# Patient Record
Sex: MALE | Employment: FULL TIME | URBAN - METROPOLITAN AREA
[De-identification: names, ages, dates, MRNs, and addresses within clinical notes are randomized per-mention and may not be internally consistent; named-entity substitution may affect disease eponyms.]

---

## 2024-03-06 ENCOUNTER — HOSPITAL ENCOUNTER (EMERGENCY)
Age: 44
Discharge: HOME | End: 2024-03-06
Attending: EMERGENCY MEDICINE
Payer: COMMERCIAL

## 2024-03-06 VITALS
HEART RATE: 98 BPM | OXYGEN SATURATION: 98 % | DIASTOLIC BLOOD PRESSURE: 99 MMHG | TEMPERATURE: 97.9 F | HEIGHT: 66 IN | WEIGHT: 217 LBS | RESPIRATION RATE: 16 BRPM | SYSTOLIC BLOOD PRESSURE: 162 MMHG | BODY MASS INDEX: 34.87 KG/M2

## 2024-03-06 DIAGNOSIS — H72.92 PERFORATION OF LEFT TYMPANIC MEMBRANE: Primary | ICD-10-CM

## 2024-03-06 PROCEDURE — 99283 EMERGENCY DEPT VISIT LOW MDM: CPT

## 2024-03-06 RX ORDER — AMOXICILLIN 500 MG/1
500 CAPSULE ORAL 3 TIMES DAILY
Qty: 21 CAPSULE | Refills: 0 | Status: SHIPPED | OUTPATIENT
Start: 2024-03-06 | End: 2024-03-13

## 2024-03-06 ASSESSMENT — PAIN SCALES - GENERAL: PAINLEVEL_OUTOF10: 1

## 2024-03-06 ASSESSMENT — PAIN - FUNCTIONAL ASSESSMENT: PAIN_FUNCTIONAL_ASSESSMENT: 0-10

## 2024-03-06 NOTE — ED TRIAGE NOTES
Patient reports he was cleaning left ear yesterday with q tip and hit his hand accidentally injuring left ear.

## 2024-03-06 NOTE — FLOWSHEET NOTE
I have reviewed discharge instructions with the patient.  The patient verbalized understanding.    Patient left ED via Discharge Method: ambulatory to Home with self.    Opportunity for questions and clarification provided.       Patient given 1 scripts.         To continue your aftercare when you leave the hospital, you may receive an automated call from our care team to check in on how you are doing.  This is a free service and part of our promise to provide the best care and service to meet your aftercare needs.” If you have questions, or wish to unsubscribe from this service please call [phone].  Thank you for Choosing our [facility] Emergency Department.

## 2024-03-06 NOTE — DISCHARGE INSTRUCTIONS
As we discussed, you have a perforated eardrum on the left.  This should heal spontaneously.  It is important that you keep your ear canal dry.  You can use wax obtained at a pharmacy to help occlude your eardrum to help decrease discomfort and keep water from going in the ear.  Follow-up with ENT for further evaluation.

## 2024-04-01 ENCOUNTER — OFFICE VISIT (OUTPATIENT)
Dept: ENT CLINIC | Age: 44
End: 2024-04-01
Payer: COMMERCIAL

## 2024-04-01 VITALS
RESPIRATION RATE: 19 BRPM | DIASTOLIC BLOOD PRESSURE: 86 MMHG | WEIGHT: 216 LBS | SYSTOLIC BLOOD PRESSURE: 146 MMHG | HEIGHT: 66 IN | BODY MASS INDEX: 34.72 KG/M2

## 2024-04-01 DIAGNOSIS — H61.21 IMPACTED CERUMEN OF RIGHT EAR: ICD-10-CM

## 2024-04-01 DIAGNOSIS — R06.81 APNEA: ICD-10-CM

## 2024-04-01 DIAGNOSIS — Z72.0 TOBACCO ABUSE: ICD-10-CM

## 2024-04-01 DIAGNOSIS — J34.89 NASAL OBSTRUCTION: ICD-10-CM

## 2024-04-01 DIAGNOSIS — J34.3 NASAL TURBINATE HYPERTROPHY: ICD-10-CM

## 2024-04-01 DIAGNOSIS — J34.2 DEVIATED NASAL SEPTUM: ICD-10-CM

## 2024-04-01 DIAGNOSIS — H72.92 PERFORATION OF LEFT TYMPANIC MEMBRANE: Primary | ICD-10-CM

## 2024-04-01 DIAGNOSIS — J34.89 NASAL DRYNESS: ICD-10-CM

## 2024-04-01 PROCEDURE — 31231 NASAL ENDOSCOPY DX: CPT | Performed by: STUDENT IN AN ORGANIZED HEALTH CARE EDUCATION/TRAINING PROGRAM

## 2024-04-01 PROCEDURE — 99244 OFF/OP CNSLTJ NEW/EST MOD 40: CPT | Performed by: STUDENT IN AN ORGANIZED HEALTH CARE EDUCATION/TRAINING PROGRAM

## 2024-04-01 PROCEDURE — 69210 REMOVE IMPACTED EAR WAX UNI: CPT | Performed by: STUDENT IN AN ORGANIZED HEALTH CARE EDUCATION/TRAINING PROGRAM

## 2024-04-01 RX ORDER — CIPROFLOXACIN AND DEXAMETHASONE 3; 1 MG/ML; MG/ML
4 SUSPENSION/ DROPS AURICULAR (OTIC) 2 TIMES DAILY
Qty: 1 EACH | Refills: 0 | Status: SHIPPED | OUTPATIENT
Start: 2024-04-01 | End: 2024-04-08

## 2024-04-01 ASSESSMENT — ENCOUNTER SYMPTOMS
WHEEZING: 0
COUGH: 0
DIARRHEA: 0
CHOKING: 0
NAUSEA: 0
FACIAL SWELLING: 0
EYE PAIN: 0
SHORTNESS OF BREATH: 0
EYE ITCHING: 0
EYE DISCHARGE: 0
STRIDOR: 0
SINUS PRESSURE: 0
APNEA: 0

## 2024-04-01 NOTE — PROGRESS NOTES
HPI:  Matthew Oquendo is a 44 y.o. male seen New    Chief Complaint   Patient presents with    Ear Problem     Patient presents today with c/o possible L sided TM perf . He states that he used a qtip to clean when getting out of the shower x 1 month ago hours later he notice imbalance and bleeding .        44-year-old male seen as a new patient referral evaluation concerns of his left ear.  He describes utilizing a Q-tip to his left ear 1 month ago where he incidentally left it in place and while he was getting dressed in the morning for work he bumped the Q-tip with his shoulder causing severe onset otalgia.  Several hours later he had a couple sneezes which resulted with left-sided bloody otorrhea and vertigo symptoms.  This lingered for a couple days before resolution.  Now he has had 3 weeks of left-sided muffled hearing and describes a broken speaker type sensation to his left ear.  There has been no further otorrhea and minimal dizziness.  He also has a secondary concern of long-term sinonasal congestion left-sided nasal obstruction and worsening snoring and apnea events at home.  He does also smoke long-term.    Past Medical History, Past Surgical History, Family history, Social History, and Medications were all reviewed with the patient today and updated as necessary.     No Known Allergies    There is no problem list on file for this patient.      Current Outpatient Medications   Medication Sig    ciprofloxacin-dexAMETHasone (CIPRODEX) 0.3-0.1 % otic suspension Place 4 drops into the left ear 2 times daily for 7 days     No current facility-administered medications for this visit.       Past Medical History:   Diagnosis Date    Headache     Hypertension     Nosebleed     Rash     Sleep apnea        History reviewed. No pertinent surgical history.    Social History     Tobacco Use    Smoking status: Every Day     Current packs/day: 1.00     Average packs/day: 1 pack/day for 15.0 years (15.0 ttl pk-yrs)

## 2024-04-23 ENCOUNTER — OFFICE VISIT (OUTPATIENT)
Dept: ENT CLINIC | Age: 44
End: 2024-04-23
Payer: COMMERCIAL

## 2024-04-23 VITALS
DIASTOLIC BLOOD PRESSURE: 84 MMHG | WEIGHT: 222 LBS | SYSTOLIC BLOOD PRESSURE: 136 MMHG | RESPIRATION RATE: 17 BRPM | BODY MASS INDEX: 35.68 KG/M2 | HEIGHT: 66 IN

## 2024-04-23 DIAGNOSIS — J34.89 NASAL OBSTRUCTION: ICD-10-CM

## 2024-04-23 DIAGNOSIS — Z86.69 HISTORY OF PERFORATION OF TYMPANIC MEMBRANE: Primary | ICD-10-CM

## 2024-04-23 DIAGNOSIS — J34.3 NASAL TURBINATE HYPERTROPHY: ICD-10-CM

## 2024-04-23 DIAGNOSIS — Z72.0 TOBACCO ABUSE: ICD-10-CM

## 2024-04-23 DIAGNOSIS — R06.81 APNEA: ICD-10-CM

## 2024-04-23 DIAGNOSIS — H61.22 CERUMEN DEBRIS ON TYMPANIC MEMBRANE OF LEFT EAR: ICD-10-CM

## 2024-04-23 DIAGNOSIS — J34.2 DEVIATED NASAL SEPTUM: ICD-10-CM

## 2024-04-23 PROCEDURE — 99213 OFFICE O/P EST LOW 20 MIN: CPT | Performed by: STUDENT IN AN ORGANIZED HEALTH CARE EDUCATION/TRAINING PROGRAM

## 2024-04-23 PROCEDURE — 69210 REMOVE IMPACTED EAR WAX UNI: CPT | Performed by: STUDENT IN AN ORGANIZED HEALTH CARE EDUCATION/TRAINING PROGRAM

## 2024-04-23 ASSESSMENT — ENCOUNTER SYMPTOMS
SHORTNESS OF BREATH: 0
FACIAL SWELLING: 0
WHEEZING: 0
SINUS PAIN: 0
APNEA: 0
CHOKING: 0
EYE ITCHING: 0
COUGH: 0
EYE PAIN: 0
EYE DISCHARGE: 0
SINUS PRESSURE: 0
STRIDOR: 0
NAUSEA: 0
DIARRHEA: 0
CONSTIPATION: 0

## 2024-04-23 NOTE — PROGRESS NOTES
HPI:  Matthew Oquendo is a 44 y.o. male seen Established   Chief Complaint   Patient presents with    Follow-up     Patient presents today for 3-4 week ear recheck . Patient states that pain has resolved. He continues to have crackling w/ loud sounds / noises .        4/1/2024: 44-year-old male seen as a new patient referral evaluation concerns of his left ear.  He describes utilizing a Q-tip to his left ear 1 month ago where he incidentally left it in place and while he was getting dressed in the morning for work he bumped the Q-tip with his shoulder causing severe onset otalgia.  Several hours later he had a couple sneezes which resulted with left-sided bloody otorrhea and vertigo symptoms.  This lingered for a couple days before resolution.  Now he has had 3 weeks of left-sided muffled hearing and describes a broken speaker type sensation to his left ear.  There has been no further otorrhea and minimal dizziness.  He also has a secondary concern of long-term sinonasal congestion left-sided nasal obstruction and worsening snoring and apnea events at home.  He does also smoke long-term.    4/23/2024 interval history: Admits that he had utilized his Pradaxa drops to the left ear consistently for the last 3 weeks.  He has had a notable improvement to the hearing still having static sensation with louder noises.  Denies any associated otalgia otorrhea.  He has been utilizing more nasal saline which has been helpful as he has dust environment at work.  Considering septoplasty and turbinate reduction although financial concerns with out-of-pocket cost will delay his incision.  He is currently getting scheduled for sleep study for long-term apnea events and snoring.    Past Medical History, Past Surgical History, Family history, Social History, and Medications were all reviewed with the patient today and updated as necessary.     No Known Allergies    There is no problem list on file for this patient.      No current

## 2025-07-18 ENCOUNTER — TELEPHONE (OUTPATIENT)
Dept: ORTHOPEDIC SURGERY | Age: 45
End: 2025-07-18

## 2025-07-24 ENCOUNTER — OFFICE VISIT (OUTPATIENT)
Age: 45
End: 2025-07-24

## 2025-07-24 VITALS — BODY MASS INDEX: 34.23 KG/M2 | WEIGHT: 213 LBS | HEIGHT: 66 IN

## 2025-07-24 DIAGNOSIS — S63.8X2A LEFT SCAPHOLUNATE LIGAMENT TEAR, INITIAL ENCOUNTER: Primary | ICD-10-CM

## 2025-07-24 RX ORDER — METOPROLOL SUCCINATE 50 MG/1
50 TABLET, EXTENDED RELEASE ORAL NIGHTLY
COMMUNITY

## 2025-07-24 RX ORDER — BETAMETHASONE SODIUM PHOSPHATE AND BETAMETHASONE ACETATE 3; 3 MG/ML; MG/ML
6 INJECTION, SUSPENSION INTRA-ARTICULAR; INTRALESIONAL; INTRAMUSCULAR; SOFT TISSUE ONCE
Status: COMPLETED | OUTPATIENT
Start: 2025-07-24 | End: 2025-07-24

## 2025-07-24 RX ORDER — ERGOCALCIFEROL 1.25 MG/1
CAPSULE, LIQUID FILLED ORAL
COMMUNITY
Start: 2025-07-22

## 2025-07-24 RX ADMIN — BETAMETHASONE SODIUM PHOSPHATE AND BETAMETHASONE ACETATE 6 MG: 3; 3 INJECTION, SUSPENSION INTRA-ARTICULAR; INTRALESIONAL; INTRAMUSCULAR; SOFT TISSUE at 17:28

## 2025-08-06 ENCOUNTER — TELEPHONE (OUTPATIENT)
Age: 45
End: 2025-08-06

## 2025-09-04 ENCOUNTER — OFFICE VISIT (OUTPATIENT)
Age: 45
End: 2025-09-04

## 2025-09-04 VITALS — WEIGHT: 213 LBS | BODY MASS INDEX: 33.43 KG/M2 | HEIGHT: 67 IN

## 2025-09-04 DIAGNOSIS — S63.8X2A LEFT SCAPHOLUNATE LIGAMENT TEAR, INITIAL ENCOUNTER: Primary | ICD-10-CM

## 2025-09-04 RX ORDER — BENAZEPRIL HYDROCHLORIDE AND HYDROCHLOROTHIAZIDE 20; 12.5 MG/1; MG/1
1 TABLET ORAL EVERY MORNING
COMMUNITY
Start: 2025-08-14